# Patient Record
Sex: MALE | Race: WHITE | NOT HISPANIC OR LATINO | Employment: FULL TIME | URBAN - METROPOLITAN AREA
[De-identification: names, ages, dates, MRNs, and addresses within clinical notes are randomized per-mention and may not be internally consistent; named-entity substitution may affect disease eponyms.]

---

## 2023-12-29 ENCOUNTER — APPOINTMENT (OUTPATIENT)
Dept: CARDIOLOGY | Facility: HOSPITAL | Age: 53
End: 2023-12-29
Payer: COMMERCIAL

## 2023-12-29 ENCOUNTER — APPOINTMENT (OUTPATIENT)
Dept: RADIOLOGY | Facility: HOSPITAL | Age: 53
End: 2023-12-29
Payer: COMMERCIAL

## 2023-12-29 ENCOUNTER — HOSPITAL ENCOUNTER (EMERGENCY)
Facility: HOSPITAL | Age: 53
Discharge: HOME | End: 2023-12-29
Attending: EMERGENCY MEDICINE
Payer: COMMERCIAL

## 2023-12-29 VITALS
OXYGEN SATURATION: 95 % | DIASTOLIC BLOOD PRESSURE: 99 MMHG | HEIGHT: 72 IN | SYSTOLIC BLOOD PRESSURE: 137 MMHG | TEMPERATURE: 98.2 F | HEART RATE: 82 BPM | RESPIRATION RATE: 20 BRPM | WEIGHT: 210 LBS | BODY MASS INDEX: 28.44 KG/M2

## 2023-12-29 DIAGNOSIS — R56.9 SEIZURE (MULTI): Primary | ICD-10-CM

## 2023-12-29 LAB
ALBUMIN SERPL BCP-MCNC: 4.4 G/DL (ref 3.4–5)
ALP SERPL-CCNC: 53 U/L (ref 33–120)
ALT SERPL W P-5'-P-CCNC: 22 U/L (ref 10–52)
ANION GAP SERPL CALC-SCNC: 19 MMOL/L (ref 10–20)
AST SERPL W P-5'-P-CCNC: 18 U/L (ref 9–39)
ATRIAL RATE: 117 BPM
ATRIAL RATE: 87 BPM
BASOPHILS # BLD AUTO: 0.04 X10*3/UL (ref 0–0.1)
BASOPHILS NFR BLD AUTO: 0.5 %
BILIRUB SERPL-MCNC: 0.5 MG/DL (ref 0–1.2)
BUN SERPL-MCNC: 15 MG/DL (ref 6–23)
CALCIUM SERPL-MCNC: 8.7 MG/DL (ref 8.6–10.3)
CARDIAC TROPONIN I PNL SERPL HS: 3 NG/L (ref 0–20)
CARDIAC TROPONIN I PNL SERPL HS: 3 NG/L (ref 0–20)
CHLORIDE SERPL-SCNC: 102 MMOL/L (ref 98–107)
CO2 SERPL-SCNC: 19 MMOL/L (ref 21–32)
CREAT SERPL-MCNC: 1.13 MG/DL (ref 0.5–1.3)
EOSINOPHIL # BLD AUTO: 0.14 X10*3/UL (ref 0–0.7)
EOSINOPHIL NFR BLD AUTO: 1.6 %
ERYTHROCYTE [DISTWIDTH] IN BLOOD BY AUTOMATED COUNT: 17.8 % (ref 11.5–14.5)
GFR SERPL CREATININE-BSD FRML MDRD: 79 ML/MIN/1.73M*2
GLUCOSE SERPL-MCNC: 236 MG/DL (ref 74–99)
HCT VFR BLD AUTO: 42.2 % (ref 41–52)
HGB BLD-MCNC: 12.8 G/DL (ref 13.5–17.5)
IMM GRANULOCYTES # BLD AUTO: 0.07 X10*3/UL (ref 0–0.7)
IMM GRANULOCYTES NFR BLD AUTO: 0.8 % (ref 0–0.9)
INR PPP: 1 (ref 0.9–1.1)
LACTATE SERPL-SCNC: 2.4 MMOL/L (ref 0.4–2)
LACTATE SERPL-SCNC: 7.2 MMOL/L (ref 0.4–2)
LYMPHOCYTES # BLD AUTO: 3.13 X10*3/UL (ref 1.2–4.8)
LYMPHOCYTES NFR BLD AUTO: 35.3 %
MAGNESIUM SERPL-MCNC: 2.24 MG/DL (ref 1.6–2.4)
MCH RBC QN AUTO: 19.6 PG (ref 26–34)
MCHC RBC AUTO-ENTMCNC: 30.3 G/DL (ref 32–36)
MCV RBC AUTO: 65 FL (ref 80–100)
MONOCYTES # BLD AUTO: 0.63 X10*3/UL (ref 0.1–1)
MONOCYTES NFR BLD AUTO: 7.1 %
NEUTROPHILS # BLD AUTO: 4.85 X10*3/UL (ref 1.2–7.7)
NEUTROPHILS NFR BLD AUTO: 54.7 %
NRBC BLD-RTO: 0.2 /100 WBCS (ref 0–0)
P AXIS: 37 DEGREES
P AXIS: 47 DEGREES
P OFFSET: 185 MS
P OFFSET: 190 MS
P ONSET: 135 MS
P ONSET: 149 MS
PLATELET # BLD AUTO: 235 X10*3/UL (ref 150–450)
POTASSIUM SERPL-SCNC: 4.6 MMOL/L (ref 3.5–5.3)
PR INTERVAL: 148 MS
PR INTERVAL: 166 MS
PROT SERPL-MCNC: 6.9 G/DL (ref 6.4–8.2)
PROTHROMBIN TIME: 11.2 SECONDS (ref 9.8–12.8)
Q ONSET: 218 MS
Q ONSET: 223 MS
QRS COUNT: 14 BEATS
QRS COUNT: 19 BEATS
QRS DURATION: 68 MS
QRS DURATION: 76 MS
QT INTERVAL: 308 MS
QT INTERVAL: 342 MS
QTC CALCULATION(BAZETT): 411 MS
QTC CALCULATION(BAZETT): 429 MS
QTC FREDERICIA: 385 MS
QTC FREDERICIA: 387 MS
R AXIS: 118 DEGREES
R AXIS: 80 DEGREES
RBC # BLD AUTO: 6.54 X10*6/UL (ref 4.5–5.9)
SODIUM SERPL-SCNC: 135 MMOL/L (ref 136–145)
T AXIS: 15 DEGREES
T AXIS: 7 DEGREES
T OFFSET: 377 MS
T OFFSET: 389 MS
VENTRICULAR RATE: 117 BPM
VENTRICULAR RATE: 87 BPM
WBC # BLD AUTO: 8.9 X10*3/UL (ref 4.4–11.3)

## 2023-12-29 PROCEDURE — 84484 ASSAY OF TROPONIN QUANT: CPT

## 2023-12-29 PROCEDURE — 80053 COMPREHEN METABOLIC PANEL: CPT

## 2023-12-29 PROCEDURE — 99285 EMERGENCY DEPT VISIT HI MDM: CPT | Performed by: EMERGENCY MEDICINE

## 2023-12-29 PROCEDURE — 70450 CT HEAD/BRAIN W/O DYE: CPT | Performed by: RADIOLOGY

## 2023-12-29 PROCEDURE — 71045 X-RAY EXAM CHEST 1 VIEW: CPT | Performed by: RADIOLOGY

## 2023-12-29 PROCEDURE — 36415 COLL VENOUS BLD VENIPUNCTURE: CPT

## 2023-12-29 PROCEDURE — 83605 ASSAY OF LACTIC ACID: CPT

## 2023-12-29 PROCEDURE — 70498 CT ANGIOGRAPHY NECK: CPT | Performed by: RADIOLOGY

## 2023-12-29 PROCEDURE — 93005 ELECTROCARDIOGRAM TRACING: CPT

## 2023-12-29 PROCEDURE — 93010 ELECTROCARDIOGRAM REPORT: CPT | Performed by: EMERGENCY MEDICINE

## 2023-12-29 PROCEDURE — 70496 CT ANGIOGRAPHY HEAD: CPT | Performed by: RADIOLOGY

## 2023-12-29 PROCEDURE — 85025 COMPLETE CBC W/AUTO DIFF WBC: CPT

## 2023-12-29 PROCEDURE — 85610 PROTHROMBIN TIME: CPT

## 2023-12-29 PROCEDURE — 70450 CT HEAD/BRAIN W/O DYE: CPT | Mod: CCI

## 2023-12-29 PROCEDURE — 2550000001 HC RX 255 CONTRASTS: Performed by: EMERGENCY MEDICINE

## 2023-12-29 PROCEDURE — 71045 X-RAY EXAM CHEST 1 VIEW: CPT

## 2023-12-29 PROCEDURE — 83735 ASSAY OF MAGNESIUM: CPT

## 2023-12-29 PROCEDURE — 70498 CT ANGIOGRAPHY NECK: CPT

## 2023-12-29 RX ORDER — LEVETIRACETAM 750 MG/1
750 TABLET ORAL 2 TIMES DAILY
Qty: 60 TABLET | Refills: 0 | OUTPATIENT
Start: 2023-12-29 | End: 2024-05-29

## 2023-12-29 RX ADMIN — IOHEXOL 50 ML: 350 INJECTION, SOLUTION INTRAVENOUS at 08:25

## 2023-12-29 ASSESSMENT — COLUMBIA-SUICIDE SEVERITY RATING SCALE - C-SSRS
2. HAVE YOU ACTUALLY HAD ANY THOUGHTS OF KILLING YOURSELF?: NO
1. IN THE PAST MONTH, HAVE YOU WISHED YOU WERE DEAD OR WISHED YOU COULD GO TO SLEEP AND NOT WAKE UP?: NO
6. HAVE YOU EVER DONE ANYTHING, STARTED TO DO ANYTHING, OR PREPARED TO DO ANYTHING TO END YOUR LIFE?: NO

## 2023-12-29 ASSESSMENT — PAIN SCALES - GENERAL
PAINLEVEL_OUTOF10: 0 - NO PAIN
PAINLEVEL_OUTOF10: 0 - NO PAIN

## 2023-12-29 ASSESSMENT — LIFESTYLE VARIABLES
EVER FELT BAD OR GUILTY ABOUT YOUR DRINKING: NO
HAVE YOU EVER FELT YOU SHOULD CUT DOWN ON YOUR DRINKING: NO
REASON UNABLE TO ASSESS: NO
HAVE PEOPLE ANNOYED YOU BY CRITICIZING YOUR DRINKING: NO
EVER HAD A DRINK FIRST THING IN THE MORNING TO STEADY YOUR NERVES TO GET RID OF A HANGOVER: NO

## 2023-12-29 ASSESSMENT — PAIN - FUNCTIONAL ASSESSMENT: PAIN_FUNCTIONAL_ASSESSMENT: 0-10

## 2023-12-29 NOTE — DISCHARGE INSTRUCTIONS
Seek immediate medical attention if you develop:  additional seizures, fever, headache, nausea, vomiting, weakness, numbness, tingling, loss of motion in your arms or legs, chest pain, shortness of breath, or any new or worsening symptoms.    Do not do any activities where it would be dangerous if you had a seizure.  These activities include, but are not limited to: driving, operating machinery, swimming alone, bathing alone, riding a bike, rock climbing, etc....    Use caution when you are around stairs or other situations where it would be dangerous if you had a seizure.    Your sugar was elevated today.  You need to follow this up with a doctor.

## 2023-12-29 NOTE — ED PROVIDER NOTES
EMERGENCY DEPARTMENT ENCOUNTER      Pt Name: Lucio Sigala  MRN: [id number]  Birthdate 1970  Date of evaluation: 12/29/2023  Provider: Marv Huber MD    CHIEF COMPLAINT       Chief Complaint   Patient presents with    Seizures         HISTORY OF PRESENT ILLNESS    HPI  Patient is a 53-year-old male presenting with seizure.  This occurred approximate 30 minutes prior to presentation.  Wife states she was sitting next to him in bed when he suddenly threw his hands up and then began to have generalized convulsions.  This lasted approximately 5 minutes.  After this, he was difficult to arouse.  Unknown length of postictal period as patient was placed into the ambulance and neither EMS nor the wife know the exact interval.  At presentation, patient is complaining of chronic neck pain but is otherwise asymptomatic.  He denies fever, chills, chest pain, shortness of breath, headache, change in vision, numbness, tingling.    Nursing Notes were reviewed.    PAST MEDICAL HISTORY     Past Medical History:   Diagnosis Date    Seizures (CMS/[facility])          SURGICAL HISTORY     History reviewed. No pertinent surgical history.      CURRENT MEDICATIONS       Previous Medications    No medications on file       ALLERGIES     Patient has no known allergies.    FAMILY HISTORY     No family history on file.       SOCIAL HISTORY       Social History     Socioeconomic History    Marital status:      Spouse name: None    Number of children: None    Years of education: None    Highest education level: None   Occupational History    None   Tobacco Use    Smoking status: Never    Smokeless tobacco: Never   Substance and Sexual Activity    Alcohol use: Yes     Comment: twice a month    Drug use: Never    Sexual activity: None   Other Topics Concern    None   Social History Narrative    None     Social Determinants of Health     Financial Resource Strain: Not on file   Food Insecurity: Not on file   Transportation Needs: Not on file    Physical Activity: Not on file   Stress: Not on file   Social Connections: Not on file   Intimate Partner Violence: Not on file   Housing Stability: Not on file       SCREENINGS                        PHYSICAL EXAM    (up to 7 for level 4, 8 or more for level 5)     ED Triage Vitals [12/29/23 0639]   Temp Heart Rate Resp BP   36.3 °C (97.3 °F) 109 18 169/87      SpO2 Temp Source Heart Rate Source Patient Position   92 % Tympanic Monitor --      BP Location FiO2 (%)     -- --       Physical Exam  Vitals and nursing note reviewed.   Constitutional:       General: He is not in acute distress.     Appearance: He is not toxic-appearing.   HENT:      Head: Normocephalic and atraumatic.      Nose: Nose normal. No congestion or rhinorrhea.      Mouth/Throat:      Mouth: Mucous membranes are moist.      Pharynx: Oropharynx is clear.   Eyes:      General:         Right eye: No discharge.         Left eye: No discharge.      Extraocular Movements: Extraocular movements intact.      Conjunctiva/sclera: Conjunctivae normal.      Pupils: Pupils are equal, round, and reactive to light.   Cardiovascular:      Rate and Rhythm: Normal rate and regular rhythm.      Pulses: Normal pulses.      Heart sounds: Normal heart sounds.   Pulmonary:      Effort: Pulmonary effort is normal.      Breath sounds: Normal breath sounds.   Abdominal:      General: There is no distension.      Palpations: Abdomen is soft.      Tenderness: There is no abdominal tenderness.   Musculoskeletal:         General: No swelling, deformity or signs of injury. Normal range of motion.      Cervical back: Normal range of motion and neck supple.   Skin:     General: Skin is warm and dry.   Neurological:      General: No focal deficit present.      Mental Status: He is oriented to person, place, and time.      Cranial Nerves: No cranial nerve deficit.      Sensory: No sensory deficit.      Motor: No weakness.          DIAGNOSTIC RESULTS     LABS:  Labs Reviewed    CBC WITH AUTO DIFFERENTIAL - Abnormal       Result Value    WBC 8.9      nRBC 0.2 (*)     RBC 6.54 (*)     Hemoglobin 12.8 (*)     Hematocrit 42.2      MCV 65 (*)     MCH 19.6 (*)     MCHC 30.3 (*)     RDW 17.8 (*)     Platelets 235      Neutrophils % 54.7      Immature Granulocytes %, Automated 0.8      Lymphocytes % 35.3      Monocytes % 7.1      Eosinophils % 1.6      Basophils % 0.5      Neutrophils Absolute 4.85      Immature Granulocytes Absolute, Automated 0.07      Lymphocytes Absolute 3.13      Monocytes Absolute 0.63      Eosinophils Absolute 0.14      Basophils Absolute 0.04     COMPREHENSIVE METABOLIC PANEL - Abnormal    Glucose 236 (*)     Sodium 135 (*)     Potassium 4.6      Chloride 102      Bicarbonate 19 (*)     Anion Gap 19      Urea Nitrogen 15      Creatinine 1.13      eGFR 79      Calcium 8.7      Albumin 4.4      Alkaline Phosphatase 53      Total Protein 6.9      AST 18      Bilirubin, Total 0.5      ALT 22     LACTATE - Abnormal    Lactate 7.2 (*)     Narrative:     Venipuncture immediately after or during the administration of Metamizole may lead to falsely low results. Testing should be performed immediately  prior to Metamizole dosing.   LACTATE - Abnormal    Lactate 2.4 (*)     Narrative:     Venipuncture immediately after or during the administration of Metamizole may lead to falsely low results. Testing should be performed immediately  prior to Metamizole dosing.   MAGNESIUM - Normal    Magnesium 2.24     PROTIME-INR - Normal    Protime 11.2      INR 1.0     SERIAL TROPONIN-INITIAL - Normal    Troponin I, High Sensitivity 3      Narrative:     Less than 99th percentile of normal range cutoff-  Female and children under 18 years old <14 ng/L; Male <21 ng/L: Negative  Repeat testing should be performed if clinically indicated.     Female and children under 18 years old 14-50 ng/L; Male 21-50 ng/L:  Consistent with possible cardiac damage and possible increased clinical   risk. Serial  measurements may help to assess extent of myocardial damage.     >50 ng/L: Consistent with cardiac damage, increased clinical risk and  myocardial infarction. Serial measurements may help assess extent of   myocardial damage.      NOTE: Children less than 1 year old may have higher baseline troponin   levels and results should be interpreted in conjunction with the overall   clinical context.     NOTE: Troponin I testing is performed using a different   testing methodology at [facility] than at other   [facility]. Direct result comparisons should only   be made within the same method.   SERIAL TROPONIN, 1 HOUR - Normal    Troponin I, High Sensitivity 3      Narrative:     Less than 99th percentile of normal range cutoff-  Female and children under 18 years old <14 ng/L; Male <21 ng/L: Negative  Repeat testing should be performed if clinically indicated.     Female and children under 18 years old 14-50 ng/L; Male 21-50 ng/L:  Consistent with possible cardiac damage and possible increased clinical   risk. Serial measurements may help to assess extent of myocardial damage.     >50 ng/L: Consistent with cardiac damage, increased clinical risk and  myocardial infarction. Serial measurements may help assess extent of   myocardial damage.      NOTE: Children less than 1 year old may have higher baseline troponin   levels and results should be interpreted in conjunction with the overall   clinical context.     NOTE: Troponin I testing is performed using a different   testing methodology at [facility] than at other   [facility]. Direct result comparisons should only   be made within the same method.   TROPONIN SERIES- (INITIAL, 1 HR)    Narrative:     The following orders were created for panel order Troponin I Series, High Sensitivity (0, 1 HR).  Procedure                               Abnormality         Status                     ---------                               -----------          ------                     Troponin I, High Sensiti...[[id number]]  Normal              Final result               Troponin, High Sensitivi...[[id number]]  Normal              Final result                 Please view results for these tests on the individual orders.       All other labs were within normal range or not returned as of this dictation.    Imaging  CT head wo IV contrast   Final Result   Addendum (preliminary) 1 of 1   Interpreted By:  Lucio Chaves,    ADDENDUM:   A 1.6 cm line of increased attenuation is present within the scalp of   the right occipital level which may be due to a scalp hematoma,   correlate for any recent trauma.        Signed by: Lucio Chaves 12/29/2023 8:41 AM        -------- ORIGINAL REPORT --------   Dictation workstation:   [id number]      Final   Unremarkable exam.        Signed by: Lucio Chaves 12/29/2023 8:30 AM   Dictation workstation:   [id number]      CT angio head and neck w and wo IV contrast   Final Result   Cervical and intracranial vasculature is within normal limits. No   evidence of dissection.        _____________   NASCET criteria for internal carotid artery stenosis:   Mild: 0% to 49%   Moderate: 50% to 69%   Severe: 70% to 99%   Complete Occlusion        Signed by: Gunjan Burnett 12/29/2023 8:55 AM   Dictation workstation:   [id number]      XR chest 1 view   Final Result   No acute cardiopulmonary disease.        MACRO:   none        Signed by: Anisa Lei 12/29/2023 7:40 AM   Dictation workstation:   [id number]           Procedures  Procedures     EMERGENCY DEPARTMENT COURSE/MDM:     Diagnoses as of 12/29/23 1630   Seizure (CMS/[facility])        Medical Decision Making  =================Attending note===============    The patient was seen by the resident/fellow.  I have personally performed a substantive portion of the encounter.  I have seen and examined the patient; agree with the workup, evaluation, MDM,   management and diagnosis.  The care plan has been  discussed with the resident; I have reviewed the resident's note and agree with the documented findings.      This is a 53 y.o. male who presents to ER with concern for seizure.  Patient was laying in bed and his arms went above his head.  He then developed a full body tonic-clonic seizure.  He last around 5 minutes.  He has remote history of seizures around 20 years ago.  He has not had one since then.  He had about 3 seizures then.  He thought he may have had a neck injury at that time.  States he went to a chiropractor.  He was on seizure medications for short period of time and he discontinued them himself because he did not think they were making a difference and he did not like the side effects.  Denies diabetes or hypertension hyperlipidemia.  His wife states that he has apneic episodes at night and snores.  It sounds like she is describing sleep apnea.  Heart is regular.  Lungs are clear.  Abdomen is soft and nontender.  NIH stroke score is a 0.                ==========================================        History obtained from the patient and his wife.  Records including labs, imaging, notes reviewed.  Given his history and physical, there is high concern for new seizure.  Cardiac labs with lactate were drawn.  Lactate notably elevated above 7.  This decreased on redraw to 2.4 without any intervention.  CMP notable for hyperglycemia of 236 consistent with his known diabetes.  Hematology notable for mild anemia of hemoglobin 12.8 only.  Troponin series within normal limits.  EKG without acute injury pattern.  Chest x-ray without acute findings.  Given his unknown vascular history.  CT angio of the head and neck were obtained in addition to the CT of the head.  All these were negative for any acute findings.  Case was discussed with Dr. Chow of neurology.  He recommended the patient be started on 750 mg Keppra twice daily for epilepsy and to follow-up in neurology clinic since the patient had return to  baseline.  There is no indication for admission at this time.  This was discussed with the patient who adamantly stated that he did not have epilepsy, that when he had been on Dilantin previously had done nothing, that all of his problems had been fixed after having a chiropractor adjusted his neck, and that that is all he would be doing now.  The risks of having another seizure, especially while driving, were discussed at length with the patient.  It was highly recommended that he not drive until he was evaluated by neurology, which he refused to do.  Regardless, patient was provided with the contact information for the neurologist office as well as a paper prescription for Keppra which his wife agreed to keep on hand should he change his mind.      Patient and or family in agreement and understanding of treatment plan.  All questions answered.      I reviewed the case with the attending ED physician. The attending ED physician agrees with the plan. Patient and/or patient´s representative was counseled regarding labs, imaging, likely diagnosis, and plan. All questions were answered.    ED Medications administered this visit:  Medications - No data to display    New Prescriptions from this visit:    New Prescriptions    No medications on file       Follow-up:  No follow-up provider specified.      Final Impression: No diagnosis found.      (Please note that portions of this note were completed with a voice recognition program.  Efforts were made to edit the dictations but occasionally words are mis-transcribed.)     Marv Huber MD  Resident  12/29/23 7807

## 2024-01-12 ENCOUNTER — APPOINTMENT (OUTPATIENT)
Dept: NEUROLOGY | Facility: CLINIC | Age: 54
End: 2024-01-12
Payer: COMMERCIAL

## 2024-01-26 ENCOUNTER — APPOINTMENT (OUTPATIENT)
Dept: NEUROLOGY | Facility: CLINIC | Age: 54
End: 2024-01-26
Payer: COMMERCIAL

## 2024-05-29 ENCOUNTER — CLINICAL SUPPORT (OUTPATIENT)
Dept: EMERGENCY MEDICINE | Facility: HOSPITAL | Age: 54
End: 2024-05-29
Payer: COMMERCIAL

## 2024-05-29 ENCOUNTER — APPOINTMENT (OUTPATIENT)
Dept: RADIOLOGY | Facility: HOSPITAL | Age: 54
End: 2024-05-29
Payer: COMMERCIAL

## 2024-05-29 ENCOUNTER — HOSPITAL ENCOUNTER (EMERGENCY)
Facility: HOSPITAL | Age: 54
Discharge: HOME | End: 2024-05-29
Attending: EMERGENCY MEDICINE
Payer: COMMERCIAL

## 2024-05-29 VITALS
BODY MASS INDEX: 27.8 KG/M2 | RESPIRATION RATE: 18 BRPM | TEMPERATURE: 97.9 F | HEART RATE: 96 BPM | WEIGHT: 205 LBS | SYSTOLIC BLOOD PRESSURE: 130 MMHG | DIASTOLIC BLOOD PRESSURE: 87 MMHG | OXYGEN SATURATION: 95 %

## 2024-05-29 DIAGNOSIS — M54.2 NECK PAIN: ICD-10-CM

## 2024-05-29 DIAGNOSIS — G40.309 NONINTRACTABLE GENERALIZED IDIOPATHIC EPILEPSY WITHOUT STATUS EPILEPTICUS (MULTI): ICD-10-CM

## 2024-05-29 DIAGNOSIS — R55 SYNCOPE AND COLLAPSE: Primary | ICD-10-CM

## 2024-05-29 DIAGNOSIS — R79.89 ELEVATED LACTIC ACID LEVEL: ICD-10-CM

## 2024-05-29 LAB
ALBUMIN SERPL BCP-MCNC: 4 G/DL (ref 3.4–5)
ALP SERPL-CCNC: 54 U/L (ref 33–120)
ALT SERPL W P-5'-P-CCNC: 16 U/L (ref 10–52)
AMPHETAMINES UR QL SCN: NORMAL
ANION GAP BLDV CALCULATED.4IONS-SCNC: 14 MMOL/L (ref 10–25)
ANION GAP SERPL CALC-SCNC: 19 MMOL/L (ref 10–20)
APAP SERPL-MCNC: <10 UG/ML
APPEARANCE UR: CLEAR
AST SERPL W P-5'-P-CCNC: 26 U/L (ref 9–39)
BARBITURATES UR QL SCN: NORMAL
BASE EXCESS BLDV CALC-SCNC: -6.7 MMOL/L (ref -2–3)
BASOPHILS # BLD AUTO: 0.06 X10*3/UL (ref 0–0.1)
BASOPHILS NFR BLD AUTO: 0.6 %
BENZODIAZ UR QL SCN: NORMAL
BILIRUB SERPL-MCNC: 0.6 MG/DL (ref 0–1.2)
BILIRUB UR STRIP.AUTO-MCNC: NEGATIVE MG/DL
BODY TEMPERATURE: 37 DEGREES CELSIUS
BUN SERPL-MCNC: 13 MG/DL (ref 6–23)
BZE UR QL SCN: NORMAL
CA-I BLDV-SCNC: 1.07 MMOL/L (ref 1.1–1.33)
CALCIUM SERPL-MCNC: 7.8 MG/DL (ref 8.6–10.6)
CANNABINOIDS UR QL SCN: NORMAL
CARDIAC TROPONIN I PNL SERPL HS: 3 NG/L (ref 0–53)
CARDIAC TROPONIN I PNL SERPL HS: <3 NG/L (ref 0–53)
CHLORIDE BLDV-SCNC: 105 MMOL/L (ref 98–107)
CHLORIDE SERPL-SCNC: 106 MMOL/L (ref 98–107)
CO2 SERPL-SCNC: 16 MMOL/L (ref 21–32)
COLOR UR: ABNORMAL
CREAT SERPL-MCNC: 0.88 MG/DL (ref 0.5–1.3)
EGFRCR SERPLBLD CKD-EPI 2021: >90 ML/MIN/1.73M*2
EOSINOPHIL # BLD AUTO: 0.08 X10*3/UL (ref 0–0.7)
EOSINOPHIL NFR BLD AUTO: 0.8 %
ERYTHROCYTE [DISTWIDTH] IN BLOOD BY AUTOMATED COUNT: 17.2 % (ref 11.5–14.5)
ETHANOL SERPL-MCNC: <10 MG/DL
FENTANYL+NORFENTANYL UR QL SCN: NORMAL
GLUCOSE BLDV-MCNC: 156 MG/DL (ref 74–99)
GLUCOSE SERPL-MCNC: 155 MG/DL (ref 74–99)
GLUCOSE UR STRIP.AUTO-MCNC: NORMAL MG/DL
HCO3 BLDV-SCNC: 19.7 MMOL/L (ref 22–26)
HCT VFR BLD AUTO: 37.6 % (ref 41–52)
HCT VFR BLD EST: 36 % (ref 41–52)
HGB BLD-MCNC: 11.7 G/DL (ref 13.5–17.5)
HGB BLDV-MCNC: 11.9 G/DL (ref 13.5–17.5)
IMM GRANULOCYTES # BLD AUTO: 0.1 X10*3/UL (ref 0–0.7)
IMM GRANULOCYTES NFR BLD AUTO: 1 % (ref 0–0.9)
KETONES UR STRIP.AUTO-MCNC: NEGATIVE MG/DL
LACTATE BLDV-SCNC: 5.8 MMOL/L (ref 0.4–2)
LACTATE SERPL-SCNC: 1.1 MMOL/L (ref 0.4–2)
LACTATE SERPL-SCNC: 3.8 MMOL/L (ref 0.4–2)
LEUKOCYTE ESTERASE UR QL STRIP.AUTO: NEGATIVE
LYMPHOCYTES # BLD AUTO: 2.29 X10*3/UL (ref 1.2–4.8)
LYMPHOCYTES NFR BLD AUTO: 23.5 %
MAGNESIUM SERPL-MCNC: 2.07 MG/DL (ref 1.6–2.4)
MCH RBC QN AUTO: 19.3 PG (ref 26–34)
MCHC RBC AUTO-ENTMCNC: 31.1 G/DL (ref 32–36)
MCV RBC AUTO: 62 FL (ref 80–100)
METHADONE UR QL SCN: NORMAL
MONOCYTES # BLD AUTO: 0.65 X10*3/UL (ref 0.1–1)
MONOCYTES NFR BLD AUTO: 6.7 %
NEUTROPHILS # BLD AUTO: 6.56 X10*3/UL (ref 1.2–7.7)
NEUTROPHILS NFR BLD AUTO: 67.4 %
NITRITE UR QL STRIP.AUTO: NEGATIVE
NRBC BLD-RTO: 0 /100 WBCS (ref 0–0)
OPIATES UR QL SCN: NORMAL
OXYCODONE+OXYMORPHONE UR QL SCN: NORMAL
OXYHGB MFR BLDV: 43.2 % (ref 45–75)
PCO2 BLDV: 42 MM HG (ref 41–51)
PCP UR QL SCN: NORMAL
PH BLDV: 7.28 PH (ref 7.33–7.43)
PH UR STRIP.AUTO: 5.5 [PH]
PLATELET # BLD AUTO: 215 X10*3/UL (ref 150–450)
PO2 BLDV: 31 MM HG (ref 35–45)
POTASSIUM BLDV-SCNC: 4.3 MMOL/L (ref 3.5–5.3)
POTASSIUM SERPL-SCNC: 4.4 MMOL/L (ref 3.5–5.3)
PROT SERPL-MCNC: 6.2 G/DL (ref 6.4–8.2)
PROT UR STRIP.AUTO-MCNC: ABNORMAL MG/DL
RBC # BLD AUTO: 6.06 X10*6/UL (ref 4.5–5.9)
RBC # UR STRIP.AUTO: ABNORMAL /UL
RBC #/AREA URNS AUTO: NORMAL /HPF
SALICYLATES SERPL-MCNC: <3 MG/DL
SAO2 % BLDV: 44 % (ref 45–75)
SODIUM BLDV-SCNC: 134 MMOL/L (ref 136–145)
SODIUM SERPL-SCNC: 137 MMOL/L (ref 136–145)
SP GR UR STRIP.AUTO: 1.02
TSH SERPL-ACNC: 2.4 MIU/L (ref 0.44–3.98)
UROBILINOGEN UR STRIP.AUTO-MCNC: NORMAL MG/DL
WBC # BLD AUTO: 9.7 X10*3/UL (ref 4.4–11.3)
WBC #/AREA URNS AUTO: NORMAL /HPF

## 2024-05-29 PROCEDURE — 85025 COMPLETE CBC W/AUTO DIFF WBC: CPT | Performed by: PHYSICIAN ASSISTANT

## 2024-05-29 PROCEDURE — 96361 HYDRATE IV INFUSION ADD-ON: CPT

## 2024-05-29 PROCEDURE — 84132 ASSAY OF SERUM POTASSIUM: CPT

## 2024-05-29 PROCEDURE — 36415 COLL VENOUS BLD VENIPUNCTURE: CPT | Performed by: PHYSICIAN ASSISTANT

## 2024-05-29 PROCEDURE — 71046 X-RAY EXAM CHEST 2 VIEWS: CPT | Mod: FOREIGN READ | Performed by: RADIOLOGY

## 2024-05-29 PROCEDURE — 84132 ASSAY OF SERUM POTASSIUM: CPT | Mod: 59 | Performed by: PHYSICIAN ASSISTANT

## 2024-05-29 PROCEDURE — 83605 ASSAY OF LACTIC ACID: CPT | Performed by: PHYSICIAN ASSISTANT

## 2024-05-29 PROCEDURE — 93005 ELECTROCARDIOGRAM TRACING: CPT

## 2024-05-29 PROCEDURE — 84443 ASSAY THYROID STIM HORMONE: CPT | Performed by: PHYSICIAN ASSISTANT

## 2024-05-29 PROCEDURE — 84484 ASSAY OF TROPONIN QUANT: CPT | Performed by: PHYSICIAN ASSISTANT

## 2024-05-29 PROCEDURE — 80143 DRUG ASSAY ACETAMINOPHEN: CPT | Performed by: PHYSICIAN ASSISTANT

## 2024-05-29 PROCEDURE — 2500000004 HC RX 250 GENERAL PHARMACY W/ HCPCS (ALT 636 FOR OP/ED): Performed by: PHYSICIAN ASSISTANT

## 2024-05-29 PROCEDURE — 80307 DRUG TEST PRSMV CHEM ANLYZR: CPT | Performed by: PHYSICIAN ASSISTANT

## 2024-05-29 PROCEDURE — 72128 CT CHEST SPINE W/O DYE: CPT

## 2024-05-29 PROCEDURE — 70450 CT HEAD/BRAIN W/O DYE: CPT

## 2024-05-29 PROCEDURE — 83735 ASSAY OF MAGNESIUM: CPT | Performed by: PHYSICIAN ASSISTANT

## 2024-05-29 PROCEDURE — 99285 EMERGENCY DEPT VISIT HI MDM: CPT | Performed by: PHYSICIAN ASSISTANT

## 2024-05-29 PROCEDURE — 2500000005 HC RX 250 GENERAL PHARMACY W/O HCPCS: Performed by: PHYSICIAN ASSISTANT

## 2024-05-29 PROCEDURE — 93010 ELECTROCARDIOGRAM REPORT: CPT | Performed by: PHYSICIAN ASSISTANT

## 2024-05-29 PROCEDURE — 72125 CT NECK SPINE W/O DYE: CPT

## 2024-05-29 PROCEDURE — 72125 CT NECK SPINE W/O DYE: CPT | Performed by: RADIOLOGY

## 2024-05-29 PROCEDURE — 99285 EMERGENCY DEPT VISIT HI MDM: CPT | Mod: 25

## 2024-05-29 PROCEDURE — 82810 BLOOD GASES O2 SAT ONLY: CPT | Mod: CCI

## 2024-05-29 PROCEDURE — 71046 X-RAY EXAM CHEST 2 VIEWS: CPT

## 2024-05-29 PROCEDURE — 36415 COLL VENOUS BLD VENIPUNCTURE: CPT

## 2024-05-29 PROCEDURE — 96374 THER/PROPH/DIAG INJ IV PUSH: CPT

## 2024-05-29 PROCEDURE — 70450 CT HEAD/BRAIN W/O DYE: CPT | Performed by: RADIOLOGY

## 2024-05-29 PROCEDURE — 72128 CT CHEST SPINE W/O DYE: CPT | Mod: FOREIGN READ | Performed by: RADIOLOGY

## 2024-05-29 PROCEDURE — 81001 URINALYSIS AUTO W/SCOPE: CPT | Performed by: PHYSICIAN ASSISTANT

## 2024-05-29 RX ORDER — LIDOCAINE 50 MG/G
1 PATCH TOPICAL DAILY
Qty: 7 PATCH | Refills: 0 | Status: SHIPPED | OUTPATIENT
Start: 2024-05-29 | End: 2024-05-29

## 2024-05-29 RX ORDER — LEVETIRACETAM 1000 MG/1
1000 TABLET ORAL 2 TIMES DAILY
Qty: 60 TABLET | Refills: 0 | Status: SHIPPED | OUTPATIENT
Start: 2024-05-29 | End: 2024-05-29

## 2024-05-29 RX ORDER — LIDOCAINE 50 MG/G
1 PATCH TOPICAL DAILY
Qty: 7 PATCH | Refills: 0 | Status: SHIPPED | OUTPATIENT
Start: 2024-05-29 | End: 2024-06-05

## 2024-05-29 RX ORDER — ACETAMINOPHEN 325 MG/1
975 TABLET ORAL ONCE
Status: COMPLETED | OUTPATIENT
Start: 2024-05-29 | End: 2024-05-29

## 2024-05-29 RX ORDER — KETOROLAC TROMETHAMINE 30 MG/ML
30 INJECTION, SOLUTION INTRAMUSCULAR; INTRAVENOUS ONCE
Status: COMPLETED | OUTPATIENT
Start: 2024-05-29 | End: 2024-05-29

## 2024-05-29 RX ORDER — LIDOCAINE 560 MG/1
1 PATCH PERCUTANEOUS; TOPICAL; TRANSDERMAL ONCE
Status: DISCONTINUED | OUTPATIENT
Start: 2024-05-29 | End: 2024-05-29 | Stop reason: HOSPADM

## 2024-05-29 RX ORDER — NAPROXEN 500 MG/1
500 TABLET ORAL
Qty: 10 TABLET | Refills: 0 | Status: SHIPPED | OUTPATIENT
Start: 2024-05-29 | End: 2024-05-29

## 2024-05-29 RX ORDER — NAPROXEN 500 MG/1
500 TABLET ORAL
Qty: 10 TABLET | Refills: 0 | Status: SHIPPED | OUTPATIENT
Start: 2024-05-29 | End: 2024-06-03

## 2024-05-29 RX ORDER — CYCLOBENZAPRINE HCL 10 MG
5 TABLET ORAL ONCE
Status: DISCONTINUED | OUTPATIENT
Start: 2024-05-29 | End: 2024-05-29

## 2024-05-29 RX ORDER — LEVETIRACETAM 750 MG/1
750 TABLET ORAL 2 TIMES DAILY
Qty: 60 TABLET | Refills: 0 | Status: SHIPPED | OUTPATIENT
Start: 2024-05-29 | End: 2024-06-28

## 2024-05-29 RX ADMIN — ACETAMINOPHEN 975 MG: 325 TABLET ORAL at 16:21

## 2024-05-29 RX ADMIN — LIDOCAINE 1 PATCH: 4 PATCH TOPICAL at 16:21

## 2024-05-29 RX ADMIN — SODIUM CHLORIDE 1000 ML: 9 INJECTION, SOLUTION INTRAVENOUS at 12:47

## 2024-05-29 RX ADMIN — KETOROLAC TROMETHAMINE 30 MG: 30 INJECTION, SOLUTION INTRAMUSCULAR; INTRAVENOUS at 16:21

## 2024-05-29 ASSESSMENT — PAIN SCALES - GENERAL: PAINLEVEL_OUTOF10: 3

## 2024-05-29 ASSESSMENT — PAIN DESCRIPTION - DESCRIPTORS: DESCRIPTORS: ACHING;DISCOMFORT

## 2024-05-29 ASSESSMENT — PAIN DESCRIPTION - ORIENTATION: ORIENTATION: POSTERIOR

## 2024-05-29 ASSESSMENT — PAIN DESCRIPTION - PROGRESSION: CLINICAL_PROGRESSION: GRADUALLY IMPROVING

## 2024-05-29 ASSESSMENT — PAIN - FUNCTIONAL ASSESSMENT: PAIN_FUNCTIONAL_ASSESSMENT: 0-10

## 2024-05-29 ASSESSMENT — PAIN DESCRIPTION - PAIN TYPE: TYPE: ACUTE PAIN

## 2024-05-29 ASSESSMENT — PAIN DESCRIPTION - LOCATION: LOCATION: NECK

## 2024-05-29 NOTE — DISCHARGE INSTRUCTIONS
You should not be driving with this possible history of seizures, at least for 6 months or until cleared by neurology.    Make sure you do not stand on any elevated surfaces, swim unattended, do anything that if you were to suddenly seize could harm yourself or others.    Make sure you are taking your seizure medications as prescribed.    If you experience an increase in seizure activity in terms of frequency, duration, etc. or you sustain any injuries while seizing, recommend return to the ER.    You should follow up with a neurologist. Our neurology team at  has recommended an EEG and brain MRI. They have also recommended you start taking Keppra, an anti-seizure medication. A prescription for Keppra was sent to your pharmacy.

## 2024-05-29 NOTE — ED TRIAGE NOTES
Pt presents to the ED after being found lying on the floor at work with snoring respirations and pinpoint pupils. Upon EMS arrival pt was given 4mg IN narcan and woke up. Pt is awake and walking around upon arrival to ED. Per chart pt has hx of seizures but refuses them in person.

## 2024-05-29 NOTE — ED PROVIDER NOTES
HPI   Chief Complaint   Patient presents with    Unresponsive       Patient is a 54-year-old male with documented history of hyperglycemia and seizures who personally denies any history of this who presents today with unresponsive episode.  Patient tells me he cannot remember exactly what happened, he found himself in the EMS truck, he denies having any aura, chest pain, shortness of breath, any lightheadedness dizziness or any inkling that he was about to pass out.  History was partially provided by the , patient is a  and is accompanied by his coworkers, they stated that they left in meeting and upon return to the room patient had pinpoint pupils and was snoring loudly, they gave him 4 of Narcan and patient woke up.  They are unsure whether he had a postictal period.  Patient denies any tongue biting, incontinence.  He denies any history of seizures despite the fact that this is well-documented in his chart, he was seen here in December after a seizure episode, which was discussed with neurology, patient was started on Keppra however per notes he refused to follow-up with neurology and refused to take his Keppra.  He was also instructed not to drive at that time.  Patient currently denies any symptoms, he is awake and alert, oriented x 3, he has no chest pain shortness of breath heart palpitations dizziness, lightheadedness, headaches, he does not believe he injured himself in any way, denies any drug or alcohol use, denies any medical problems, denies abdominal pain, denies any recent dieting, changes in lifestyle or anything that would explain his passing out episode.  Denies cardiac past medical history.                            [place] Coma Scale Score: 15                     Patient History   Past Medical History:   Diagnosis Date    Seizures (Multi)      History reviewed. No pertinent surgical history.  No family history on file.  Social History     Tobacco Use    Smoking status:  Never    Smokeless tobacco: Never   Substance Use Topics    Alcohol use: Yes     Comment: twice a month    Drug use: Never       Physical Exam   ED Triage Vitals [05/29/24 1213]   Temperature Heart Rate Respirations BP   36.7 °C (98 °F) (!) 103 16 142/62      Pulse Ox Temp src Heart Rate Source Patient Position   100 % -- -- --      BP Location FiO2 (%)     -- --       Physical Exam  Vitals and nursing note reviewed.   Constitutional:       General: He is not in acute distress.     Appearance: Normal appearance. He is not toxic-appearing.   HENT:      Head: Normocephalic and atraumatic.      Nose: Nose normal.   Eyes:      General: No visual field deficit.     Extraocular Movements: Extraocular movements intact.   Cardiovascular:      Rate and Rhythm: Normal rate and regular rhythm.   Pulmonary:      Effort: Pulmonary effort is normal.      Breath sounds: Normal breath sounds. No wheezing, rhonchi or rales.   Abdominal:      Palpations: Abdomen is soft.      Tenderness: There is no abdominal tenderness. There is no guarding.   Musculoskeletal:         General: Normal range of motion.      Cervical back: Normal range of motion and neck supple.   Skin:     General: Skin is warm and dry.   Neurological:      General: No focal deficit present.      Mental Status: He is alert and oriented to person, place, and time.      GCS: GCS eye subscore is 4. GCS verbal subscore is 5. GCS motor subscore is 6.      Cranial Nerves: Cranial nerves 2-12 are intact. No cranial nerve deficit, dysarthria or facial asymmetry.      Sensory: Sensation is intact. No sensory deficit.      Motor: Motor function is intact. No weakness.   Psychiatric:         Mood and Affect: Mood normal.         Thought Content: Thought content normal.       CT head wo IV contrast   Final Result   No evidence of intracranial mass, extra-axial collection, hemorrhage   or infarction        MACRO:   none        Signed by: Cam Lopez 5/29/2024 1:22 PM    Dictation workstation:   [id number]      CT thoracic spine wo IV contrast   Final Result   No acute fracture or malalignment.  Minimal degenerative changes as   described.   Signed by Durga Ivey DO      CT cervical spine wo IV contrast   Final Result   Normal examination of the cervical spine. No evidence of fracture,   subluxation, bony canal stenosis or bony foraminal narrowing        MACRO:   none        Signed by: Cam Lopez 5/29/2024 1:23 PM   Dictation workstation:   [id number]      XR chest 2 views   Final Result   No regions of airspace consolidation.   Signed by Laith Raymundo MD      MR brain w and wo IV contrast    (Results Pending)     Labs Reviewed   CBC WITH AUTO DIFFERENTIAL - Abnormal       Result Value    WBC 9.7      nRBC 0.0      RBC 6.06 (*)     Hemoglobin 11.7 (*)     Hematocrit 37.6 (*)     MCV 62 (*)     MCH 19.3 (*)     MCHC 31.1 (*)     RDW 17.2 (*)     Platelets 215      Neutrophils % 67.4      Immature Granulocytes %, Automated 1.0 (*)     Lymphocytes % 23.5      Monocytes % 6.7      Eosinophils % 0.8      Basophils % 0.6      Neutrophils Absolute 6.56      Immature Granulocytes Absolute, Automated 0.10      Lymphocytes Absolute 2.29      Monocytes Absolute 0.65      Eosinophils Absolute 0.08      Basophils Absolute 0.06     COMPREHENSIVE METABOLIC PANEL - Abnormal    Glucose 155 (*)     Sodium 137      Potassium 4.4      Chloride 106      Bicarbonate 16 (*)     Anion Gap 19      Urea Nitrogen 13      Creatinine 0.88      eGFR >90      Calcium 7.8 (*)     Albumin 4.0      Alkaline Phosphatase 54      Total Protein 6.2 (*)     AST 26      Bilirubin, Total 0.6      ALT 16     URINALYSIS WITH REFLEX CULTURE AND MICROSCOPIC - Abnormal    Color, Urine Light-Yellow      Appearance, Urine Clear      Specific Gravity, Urine 1.019      pH, Urine 5.5      Protein, Urine 50 (1+) (*)     Glucose, Urine Normal      Blood, Urine 0.06 (1+) (*)     Ketones, Urine NEGATIVE       Bilirubin, Urine NEGATIVE      Urobilinogen, Urine Normal      Nitrite, Urine NEGATIVE      Leukocyte Esterase, Urine NEGATIVE     LACTATE - Abnormal    Lactate 3.8 (*)     Narrative:     Venipuncture immediately after or during the administration of Metamizole may lead to falsely low results. Testing should be performed immediately  prior to Metamizole dosing.   BLOOD GAS VENOUS FULL PANEL UNSOLICITED - Abnormal    POCT pH, Venous 7.28 (*)     POCT pCO2, Venous 42      POCT pO2, Venous 31 (*)     POCT SO2, Venous 44 (*)     POCT Oxy Hemoglobin, Venous 43.2 (*)     POCT Hematocrit Calculated, Venous 36.0 (*)     POCT Sodium, Venous 134 (*)     POCT Potassium, Venous 4.3      POCT Chloride, Venous 105      POCT Ionized Calicum, Venous 1.07 (*)     POCT Glucose, Venous 156 (*)     POCT Lactate, Venous 5.8 (*)     POCT Base Excess, Venous -6.7 (*)     POCT HCO3 Calculated, Venous 19.7 (*)     POCT Hemoglobin, Venous 11.9 (*)     POCT Anion Gap, Venous 14.0      Patient Temperature 37.0     MAGNESIUM - Normal    Magnesium 2.07     TSH WITH REFLEX TO FREE T4 IF ABNORMAL - Normal    Thyroid Stimulating Hormone 2.40      Narrative:     TSH testing is performed using different testing methodology at [facility] than at other [facility]. Direct result comparisons should only be made within the same method.     DRUG SCREEN,URINE - Normal    Amphetamine Screen, Urine Presumptive Negative      Barbiturate Screen, Urine Presumptive Negative      Benzodiazepines Screen, Urine Presumptive Negative      Cannabinoid Screen, Urine Presumptive Negative      Cocaine Metabolite Screen, Urine Presumptive Negative      Fentanyl Screen, Urine Presumptive Negative      Opiate Screen, Urine Presumptive Negative      Oxycodone Screen, Urine Presumptive Negative      PCP Screen, Urine Presumptive Negative      Methadone Screen, Urine Presumptive Negative      Narrative:     Drug screen results are presumptive and should not  be used to assess   compliance with prescribed medication. Contact the performing [facility] laboratory   to add-on definitive confirmatory testing if clinically indicated.    Toxicology screening results are reported qualitatively. The concentration must   be greater than or equal to the cutoff to be reported as positive. The concentration   at which the screening test can detect an individual drug or metabolite varies.   The absence of expected drug(s) and/or drug metabolite(s) may indicate non-compliance,   inappropriate timing of specimen collection relative to drug administration, poor drug   absorption, diluted/adulterated urine, or limitations of testing. For medical purposes   only; not valid for forensic use.    Interpretive questions should be directed to the laboratory medical directors.   ACUTE TOXICOLOGY PANEL, BLOOD - Normal    Acetaminophen <10.0      Salicylate  <3      Alcohol <10     SERIAL TROPONIN-INITIAL - Normal    Troponin I, High Sensitivity <3      Narrative:     Less than 99th percentile of normal range cutoff-  Female and children under 18 years old <35 ng/L; Male <54 ng/L: Negative  Repeat testing should be performed if clinically indicated.     Female and children under 18 years old  ng/L; Male  ng/L:  Consistent with possible cardiac damage and possible increased clinical   risk. Serial measurements may help to assess extent of myocardial damage.     >120 ng/L: Consistent with cardiac damage, increased clinical risk and  myocardial infarction. Serial measurements may help assess extent of   myocardial damage.      NOTE: Children less than 1 year old may have higher baseline troponin   levels and results should be interpreted in conjunction with the overall   clinical context.    NOTE: Troponin I testing is performed using a different   testing methodology at [facility] than at other   [facility]. Direct result comparisons should only   be made within the same  method.     SERIAL TROPONIN, 1 HOUR - Normal    Troponin I, High Sensitivity 3      Narrative:     Less than 99th percentile of normal range cutoff-  Female and children under 18 years old <35 ng/L; Male <54 ng/L: Negative  Repeat testing should be performed if clinically indicated.     Female and children under 18 years old  ng/L; Male  ng/L:  Consistent with possible cardiac damage and possible increased clinical   risk. Serial measurements may help to assess extent of myocardial damage.     >120 ng/L: Consistent with cardiac damage, increased clinical risk and  myocardial infarction. Serial measurements may help assess extent of   myocardial damage.      NOTE: Children less than 1 year old may have higher baseline troponin   levels and results should be interpreted in conjunction with the overall   clinical context.    NOTE: Troponin I testing is performed using a different   testing methodology at [facility] than at other   [facility]. Direct result comparisons should only   be made within the same method.     LACTATE - Normal    Lactate 1.1      Narrative:     Venipuncture immediately after or during the administration of Metamizole may lead to falsely low results. Testing should be performed immediately  prior to Metamizole dosing.   URINALYSIS MICROSCOPIC WITH REFLEX CULTURE - Normal    WBC, Urine 1-5      RBC, Urine 1-2     TROPONIN SERIES- (INITIAL, 1 HR)    Narrative:     The following orders were created for panel order Troponin Series, (0, 1 HR).  Procedure                               Abnormality         Status                     ---------                               -----------         ------                     Troponin I, High Sensiti...[[id number]]  Normal              Final result               Troponin, High Sensitivi...[[id number]]  Normal              Final result                 Please view results for these tests on the individual orders.   URINALYSIS WITH  REFLEX CULTURE AND MICROSCOPIC    Narrative:     The following orders were created for panel order Urinalysis with Reflex Culture and Microscopic.  Procedure                               Abnormality         Status                     ---------                               -----------         ------                     Urinalysis with Reflex C...[[id number]]  Abnormal            Final result               Extra Urine Gray Tube[[id number]]                            Final result                 Please view results for these tests on the individual orders.   EXTRA URINE GRAY TUBE    Extra Tube Hold for add-ons.     BLOOD GAS LACTIC ACID, VENOUS         ED Course & MDM   ED Course as of 05/30/24 1052   Wed May 29, 2024   7124   I spoke with patient and his wife at this time, with his elevated lactate, I do have concerns that patient may be having undiagnosed seizures, I recommended neurology consultation in the hospital however patient declined this, states he would much rather follow-up as an outpatient.  Patient does not want to start any medications without a definitive diagnosis and he is unwilling to stay in the hospital for an EEG.  We engaged in shared decision making, I feel that it is reasonable for patient to follow-up outpatient however he is aware not to drive or operate heavy machinery, not to stand on elevated surfaces or swim in any pools, he is aware essentially that if anything were to acutely incapacitate him that he should not be doing something that can cause danger to himself or others.  His wife is at bedside, they are both comfortable with this plan, they would prefer to follow-up with [facility] given that that is where they have insurance through however I will still give them  neurology to follow-up as needed. [MK]   1950 Patient evaluated by neurology, they recommended him staying for spot EEG, patient notes understanding has capacity but declines.  He also does not want follow-up with  , but rather with [facility] given his insurance, will provide the follow-up information with patient, as well as start him on Keppra with strict return precautions.  He is neurologically intact and walked out of the emergency department. 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  Per review of patient's records, it appears he has a history of seizures, had a seizure and was brought to [facility] emergency room in December, patient was started on Keppra at that time however per documentation in the note, I do not believe that he has been taking it, patient's lactate is found to be elevated at 5.8 and I do have concerns that he may have had a seizure.  I did order EKG, troponin, labs.  Patient's EKG showed T wave inversion in III, however that is chronic and was present on previous EKG.   Patient CT head neck and thoracic spine were all negative, chest x-ray unremarkable, EKG nonischemic, troponin under 3 both times, tox panel and urine drug screen were negative, urinalysis was negative, patient's ABG unfortunately did show an elevated lactate of 5.8, per review of his records it appears that they were concerned that he had a seizure at [facility] back in December.  His lactate had down trended to 3.8.  I updated patient on his results, discussed with him that I really strongly suspect he may be having seizures, patient states they told him that 25 years ago, tried putting him on Dilantin and he had a lot of side effects related to it.  He states that he never took the Keppra that he was discharged with from [facility] because he does not want to  take a medication for diagnosis that is not clear.  I discussed with patient my recommendation for neurology to see him, initially patient had declined, however I was called back to the room after patient and wife had discussed and they are agreeable to a neurology consult at this time.  I did page neurology to see the patient while in the ED.  Final disposition pending their recommendations, patient signed out to Christy Montesinos.        Procedure  Procedures    Attending Note:  The patient was seen by the resident/fellow/FERNANDA.  I have personally performed a substantive portion of the encounter.  I have seen and examined the patient; agree with the workup, evaluation, MDM, management and diagnosis with the exception/addition of the following.  The care plan has been discussed with the resident/fellow; I have reviewed the resident/fellow’s note and agree with the documented findings with the exception/addition of the following:       HPI:   Lucio Sigala is a 54 year old with history of possible seizures presenting to the ED for evaluation. Initial history is provided predominately via report.  The patient reports he is a .  He reportedly was found lying on the floor at work with “snoring” respirations and “pinpoint” pupils and was given 4 mg IN Narcan and reportedly woke. In discussion with patient, he reports that he was fatigued today and had not consumed any caffeine today. He reports that he was working on the computer and apparently lost consciousness, waking in the ambulance. He denies any chest pain, SOB, palpitations, leg swelling, headache or focal weakness, numbness, or paresthesia.  He denies any recent travel, immobilization, prior malignancy, or prior DVT/PE.  He denies any significant family history including but not limited to arhythmia, CAD, or sudden cardiac death.     Per chart review, seen 12/29/2023 at [facility] for concern for seizures. CTH without acute intracranial process.  Case  discussed with neurology and patient started on 750 mg BID of Keppra. He was instructed to follow-up with epilepsy clinic. Per notes “This was discussed with the patient who adamantly stated that he did not have epilepsy, that when he had been on Dilantin previously had done nothing, that all of his problems had been fixed after having a chiropractor adjusted his neck, and that that is all he would be doing now.  The risks of having another seizure, especially while driving, were discussed at length with the patient.  It was highly recommended that he not drive until he was evaluated by neurology, which he refused to do.  Regardless, patient was provided with the contact information for the neurologist office as well as a paper prescription for Keppra which his wife agreed to keep on hand should he change his mind.”    Additional History Obtained from: See HPI       Physical Exam:  General: Grossly well-developed, awake, alert, NAD  Head: Normocephalic, no overt external signs of trauma. Well-healed scar over occipital scalp without signs of acute laceration or wounds  ENT: Mucus membranes moist, nares patent  Neck: Supple, trachea midline. Lower cervical paraspinal TTP, no gross stepoff/deformities  Neurologic: A&Ox4, FS, TM, EOMI, PERRL, CASIANO x 4 with grossly symmetric strength, 5/5, SILT grossly intact BUE and BLE. Babinski's downgoing, no clonus.   Cardiovascular: RRR, no MRG, pulses grossly symmetric. Chest wall non-tender, no crepitus  Respiratory: CTA B/L, no wheeze, rales or rhonchi  Abdomen: Soft, not appreciably tender, no evident rebound/guarding, no pulsatile masses palpable.   Back: No apparent CVA TTP, no midline TLS spine stepoffs, or acute deformities. Upper thoracic paraspinal TTP. No midline T/L spine TTP.   MSK: No gross bony deformities in BUE and BLE or TTP  Skin/Integumentary: Warm and dry  Psychiatric: Calm and cooperative. Normal mood and affect.     EKG Interpretation:  EKG 5/29/24 - 9277 -  sinus rhythm, right axis, rate 97, intervals , QRS 82, QTc 431. No obvious significant acute ST elevation or depression. TWI lead III seen 12/29/23.  Rate increased from 87.   Interpreted by provider as above    Differential Diagnoses Considered:  See MDM below    Chronic Medical Conditions Significantly Affecting Care:  See MDM below    External Records Reviewed:  I reviewed recent and relevant outside records as noted in HPI above and MDM below.     Independent Interpretation of Studies:    Laboratory/Imaging Studies:  Laboratory results personally reviewed and independently interpreted:  CBC without significant thrombocytopenia or leukocytosis. Hb 11.7, prior 12.8 approximately 5 months ago.   Metabolic panel without JOSE, HAGMA or significant electrolyte anomalies. HCO3 16, AG approximately 15. No transaminitis or hyperbilirubinemia  No marked hypomagnesemia  TSH WNL  HS troponin not elevated, repeat stable  VBG 7.28/42/31, lactate 5.8. Repeat lactate 3.8  UA negative LE and nitrites  Urine tox negative    Radiology imaging and preliminary and/or final reports personally reviewed/independently interpreted:  CXR   IMPRESSION:  No regions of airspace consolidation.    CT head  IMPRESSION:  No evidence of intracranial mass, extra-axial collection, hemorrhage  or infarction    CT thoracic spine  IMPRESSION:  No acute fracture or malalignment.  Minimal degenerative changes as  described.    CT cervical spine  IMPRESSION:  Normal examination of the cervical spine. No evidence of fracture,  subluxation, bony canal stenosis or bony foraminal narrowing    Social Determinants of Health Significantly Affecting Care:  See HPI/MDM    Prescription Drug Consideration:  See MDM    Diagnostic testing considered:  See MDM and relevant sections      Medical Decision Making/Course:  54 year old with history of possible seizures presenting to the ED for evaluation. The patient was found on the ground by coworkers. He was given  Narcan due to concern for possible pinpoint pupils.  On arrival, he is HDS, mentation appropriate, GCS 15, and without any apparent gross neurological deficits.  He reports pain over the upper thoracic, lower cervical spine, for which CT C/T spine obtained and was negative for acute traumatic injuries. CTH without acute intracranial process. CXR without acute process. He had no other areas of TTP on exam, nor clear signs of acute traumatic injuries. VBG demonstrated lactic acidosis concerning for possible seizure given history. Repeat lactate was downtrending. This ultimately normalized. No signs of sepsis, hypotension or hypoperfusion at present. His urine tox was negative and given history, suspiscion is lower for overdose.  Discussed at length with patient (and spouse with permission of patient) regarding possible seizures, as well as need for neurological evaluation and restrictions on driving, operating machinery or engaging in potentially dangerous activities.  We consulted neurology for further recommendations. He was signed out at approximately 1500 at end of shift to oncoming ED provider team, pending outstanding labs, re-evaluation, neurology consult, and final disposition. An opportunity to ask questions was provided and all were addressed at that time.  The patient verbalized understanding and was in agreement with plan.          Ayesha Allred PA-C  05/29/24 6050       Kennedy Vega MD  05/30/24 5881

## 2024-05-29 NOTE — PROGRESS NOTES
I received Lucio Sigala in signout from Ayesha Allred PA-C.  Please see the previous note for all HPI, PE and MDM up to the time of signout at 1500.  This is in addition to the primary documentation.    In brief Lucio Sigala is an 54 y.o. male presenting for unresponsive episode thought to be seizure. Patient previously seen for seizure in December and recommended starting Keppra and EEG, however pt declined neurology consult, EEG, and Keppra. On arrival, pt had lactate 5.8. Drug screen was negative.    Chief Complaint   Patient presents with    Unresponsive      At the time of signout we were awaiting: Disposition    MDM:  ED Course as of 05/29/24 2017   Wed May 29, 2024   [id number]   I spoke with patient and his wife at this time, with his elevated lactate, I do have concerns that patient may be having undiagnosed seizures, I recommended neurology consultation in the hospital however patient declined this, states he would much rather follow-up as an outpatient.  Patient does not want to start any medications without a definitive diagnosis and he is unwilling to stay in the hospital for an EEG.  We engaged in shared decision making, I feel that it is reasonable for patient to follow-up outpatient however he is aware not to drive or operate heavy machinery, not to stand on elevated surfaces or swim in any pools, he is aware essentially that if anything were to acutely incapacitate him that he should not be doing something that can cause danger to himself or others.  His wife is at bedside, they are both comfortable with this plan, they would prefer to follow-up with [facility] given that that is where they have insurance through however I will still give them  neurology to follow-up as needed. [MK]   1950 Patient evaluated by neurology, they recommended him staying for spot EEG, patient notes understanding has capacity but declines.  He also does not want follow-up with , but rather with [facility] given his  insurance, will provide the follow-up information with patient, as well as start him on Keppra with strict return precautions.  He is neurologically intact and walked out of the emergency department. [ND]      ED Course User Index  [MK] Ayesha Allred PA-C  [ND] Azael Bolton MD         Diagnoses as of 05/29/24 2017   Syncope and collapse   Elevated lactic acid level   Neck pain       At time of signout patient had initially declined talking being seen by neurology or staying for EEG and seizure workup, however on reevaluation by previous provider he had decided to stay for neurology consult. Repeat lactate downtrending to 3.8. With negative UDS and tox panel, it is very likely that the patient's episode of unresponsiveness was due to seizure rather than intoxication.    Neurology consultant evaluated the patient.  They recommended he stay for spot EEG and start 750 mg Keppra BID.  Patient declined to stay for spot EEG, says he will schedule as outpatient. He prefers to follow up with [facility] as his insurance is through [facility]. Neurology also recommended brain MRI as outpatient. I communicated these recommendations to the patient, and they were included in writing in his AVS.  Patient was provided with a printed prescription for Keppra 750 mg BID as well as lidocaine patches and naproxen.    Discussed seizure precautions with the patient, and advised him he should not participate in any activities that would cause himself or anyone else harm if he were to suddenly seize, including driving or swimming alone.  Advised him he should not drive for at least 6 months or until cleared by a neurologist.  Reviewed return precautions, including changes in mental status, confusion, new seizures, or any new concerns.  Patient was neurologically intact, and ambulated out of the department independently.      Assessment and plan discussed with Dr. Reed    Disposition: Discharge    Christy Escobar MD   Emergency  Medicine, PGY-1

## 2024-05-30 LAB
ATRIAL RATE: 97 BPM
HOLD SPECIMEN: NORMAL
P AXIS: 44 DEGREES
P OFFSET: 188 MS
P ONSET: 136 MS
PR INTERVAL: 162 MS
Q ONSET: 217 MS
QRS COUNT: 16 BEATS
QRS DURATION: 82 MS
QT INTERVAL: 340 MS
QTC CALCULATION(BAZETT): 431 MS
QTC FREDERICIA: 398 MS
R AXIS: 106 DEGREES
T AXIS: 23 DEGREES
T OFFSET: 387 MS
VENTRICULAR RATE: 97 BPM

## 2025-05-19 NOTE — CONSULTS
"Reason For Consult  Seizures    History Of Present Illness  Lucio Sigala is a 54 y.o. male presenting with history of seizures (not on any medications), who presents to the ED due to concern for a seizure that occurred during work. The patient is a  and was at work this morning. States that he was nodding off and that recently, he has been having difficulty sleeping (~5hrs a night), but this is not unusual for him. He recalls walking to the bathroom with no problem, no lightheadness, dizziness, palpitations, vision changes, olfactory changes. Doesn't remember making it back to his chair, or whether he was sitting or standing, but states that he was told that he was found on the floor. Additionally denies any vision changes, SOB, dizziness prior to the episode. Does not recall any incontinence, tongue biting, or muscle soreness after the event. Denies any recent illnesses, alcohol use, drug use, or changes in medications. Does not take any medications at baseline.     Spoke to the  who found patient on the floor. She states that he did not appear confused earlier in the morning. She had left the room, and messaged him that he was running late coming back. Around 30 minutes later, she found him on the floor. States that he was laying on the ground with eyes shut, breathing heavily, snoring, and diaphoretic. Did not notice any shaking and he was not responsive to name or touch. She called EMS and he was given Narcan. Woke up afterwards, but appeared lethargic and confused.     Of note, pt presented to the ED in 12/2023 due to c/f seizure. During that time, the patient reportedly was sleeping next to his wife when he suddenly \"threw his hands up\", turned his head to the left, and then had generalized convulsions for 5 minutes. Afterwards was post-ictal for an unclear period of time. No tongue biting or incontinence. He has a remote history of seizures ~20 years ago after a traumatic event when " Creatine stable for now. BMP reviewed- noted Estimated Creatinine Clearance: 42.5 mL/min (A) (based on SCr of 2.4 mg/dL (H)). according to latest data. Based on current GFR, CKD stage is stage 3 - GFR 30-59.  Monitor UOP and serial BMP and adjust therapy as needed. Renally dose meds. Avoid nephrotoxic medications and procedures.     he hit the back of his head. States that he has not had any seizures or episodes since then until 12/2023. Was previously on Dilantin, but had elected to stop the medication. Stated that at the time, he had head imaging and EEG that was all negative. He attributes to these episodes to his chronic cervical neck pain that resolved with adjustment by his chiropractor. During his admission in 12/2023, he was recommended to start Keppra 750mg BID and follow up with neurology. However, he had to cancel his appointment due scheduling conflicts. He has not started taking the Keppra.     Past Medical History  He has a past medical history of Seizures (Multi).   Denies any history of palpitations, arrhythmias, heart failure.    Medications  Not currently on any medications.    Surgical History  He has no past surgical history on file.     Social History  Tobacco: denies  Alcohol: drinks socially   Denies any recreational drug use.   Independent at home, works as a .    Family History  No family history of seizures.     Allergies  Patient has no known allergies.    Review of Systems  Negative except as per HPI     Physical Exam  GENERAL APPEARANCE:  No distress, alert, interactive and cooperative.     CARDIOVASCULAR: Regular, rate and rhythm, no murmurs, normal S1 and S2.     MENTAL STATE:   Orientation was normal to time, place and person. Recent and remote memory was intact. General fund of knowledge was intact.    CRANIAL NERVES:   CN 2   Visual fields full to confrontation.   CN 3, 4, 6   Pupils round, 3 mm in diameter, equally reactive to light. Lids symmetric; no ptosis. EOMs normal alignment. No nystagmus.   CN 5   Facial sensation intact bilaterally.   CN 7   Normal and symmetric facial strength. Nasolabial folds symmetric.   CN 8   Hearing intact to voice  CN 9   Palate elevates symmetrically.   CN 11   Normal strength of shoulder shrug and neck turning.   CN 12   Tongue midline, with normal bulk and  strength; no fasciculations.     MOTOR:   Muscle bulk was normal and tone was normal in both upper and lower extremities. No fasciculations, tremor or other abnormal movements were present.                     R       L  Delt          5       5  Bicep        5       5  Tricep       5       5   Wrist Flex  5       5   Wrist Ext   5       5            5       5    Hip Flex    4+       5  Hip Ext      5       5  Leg Ext      5       5  Leg Flex    5       5  DF             5       5  PF             5       5     REFLEXES:     RIGHT UE   LEFT UE   BR:2            BR:2     Biceps:2      Biceps:2     Triceps:2     Triceps:2       RIGHT LLE   LEFT LLE     Knee:2         Knee:2     Ankle:2        Ankle:2       SENSORY:   Sensory exam was normal. In both upper and lower extremities, sensation was intact to light touch    COORDINATION:    Coordination exam was normal. In both upper extremities, finger-nose-finger was intact without dysmetria or overshoot. In both lower extremities, heel-to-shin was intact.     GAIT:   Gait was normal. Station was stable with a normal base. Gait was stable with a normal arm swing and speed. No ataxia, shuffling, steppage or waddling was present. No Romberg sign was present.      Last Recorded Vitals  Blood pressure 142/62, pulse (!) 103, temperature 36.7 °C (98 °F), resp. rate 16, weight 93 kg (205 lb), SpO2 96%.    Relevant Results  Results for orders placed or performed during the hospital encounter of 05/29/24 (from the past 24 hour(s))   Blood Gas Venous Full Panel Unsolicited   Result Value Ref Range    POCT pH, Venous 7.28 (L) 7.33 - 7.43 pH    POCT pCO2, Venous 42 41 - 51 mm Hg    POCT pO2, Venous 31 (L) 35 - 45 mm Hg    POCT SO2, Venous 44 (L) 45 - 75 %    POCT Oxy Hemoglobin, Venous 43.2 (L) 45.0 - 75.0 %    POCT Hematocrit Calculated, Venous 36.0 (L) 41.0 - 52.0 %    POCT Sodium, Venous 134 (L) 136 - 145 mmol/L    POCT Potassium, Venous 4.3 3.5 - 5.3 mmol/L    POCT Chloride,  Venous 105 98 - 107 mmol/L    POCT Ionized Calicum, Venous 1.07 (L) 1.10 - 1.33 mmol/L    POCT Glucose, Venous 156 (H) 74 - 99 mg/dL    POCT Lactate, Venous 5.8 (HH) 0.4 - 2.0 mmol/L    POCT Base Excess, Venous -6.7 (L) -2.0 - 3.0 mmol/L    POCT HCO3 Calculated, Venous 19.7 (L) 22.0 - 26.0 mmol/L    POCT Hemoglobin, Venous 11.9 (L) 13.5 - 17.5 g/dL    POCT Anion Gap, Venous 14.0 10.0 - 25.0 mmol/L    Patient Temperature 37.0 degrees Celsius   CBC and Auto Differential   Result Value Ref Range    WBC 9.7 4.4 - 11.3 x10*3/uL    nRBC 0.0 0.0 - 0.0 /100 WBCs    RBC 6.06 (H) 4.50 - 5.90 x10*6/uL    Hemoglobin 11.7 (L) 13.5 - 17.5 g/dL    Hematocrit 37.6 (L) 41.0 - 52.0 %    MCV 62 (L) 80 - 100 fL    MCH 19.3 (L) 26.0 - 34.0 pg    MCHC 31.1 (L) 32.0 - 36.0 g/dL    RDW 17.2 (H) 11.5 - 14.5 %    Platelets 215 150 - 450 x10*3/uL    Neutrophils % 67.4 40.0 - 80.0 %    Immature Granulocytes %, Automated 1.0 (H) 0.0 - 0.9 %    Lymphocytes % 23.5 13.0 - 44.0 %    Monocytes % 6.7 2.0 - 10.0 %    Eosinophils % 0.8 0.0 - 6.0 %    Basophils % 0.6 0.0 - 2.0 %    Neutrophils Absolute 6.56 1.20 - 7.70 x10*3/uL    Immature Granulocytes Absolute, Automated 0.10 0.00 - 0.70 x10*3/uL    Lymphocytes Absolute 2.29 1.20 - 4.80 x10*3/uL    Monocytes Absolute 0.65 0.10 - 1.00 x10*3/uL    Eosinophils Absolute 0.08 0.00 - 0.70 x10*3/uL    Basophils Absolute 0.06 0.00 - 0.10 x10*3/uL   Comprehensive metabolic panel   Result Value Ref Range    Glucose 155 (H) 74 - 99 mg/dL    Sodium 137 136 - 145 mmol/L    Potassium 4.4 3.5 - 5.3 mmol/L    Chloride 106 98 - 107 mmol/L    Bicarbonate 16 (L) 21 - 32 mmol/L    Anion Gap 19 10 - 20 mmol/L    Urea Nitrogen 13 6 - 23 mg/dL    Creatinine 0.88 0.50 - 1.30 mg/dL    eGFR >90 >60 mL/min/1.73m*2    Calcium 7.8 (L) 8.6 - 10.6 mg/dL    Albumin 4.0 3.4 - 5.0 g/dL    Alkaline Phosphatase 54 33 - 120 U/L    Total Protein 6.2 (L) 6.4 - 8.2 g/dL    AST 26 9 - 39 U/L    Bilirubin, Total 0.6 0.0 - 1.2 mg/dL    ALT 16  10 - 52 U/L   Magnesium   Result Value Ref Range    Magnesium 2.07 1.60 - 2.40 mg/dL   TSH with reflex to Free T4 if abnormal   Result Value Ref Range    Thyroid Stimulating Hormone 2.40 0.44 - 3.98 mIU/L   Acute Toxicology Panel, Blood   Result Value Ref Range    Acetaminophen <10.0 10.0 - 30.0 ug/mL    Salicylate  <3 4 - 20 mg/dL    Alcohol <10 <=10 mg/dL   Troponin I, High Sensitivity, Initial   Result Value Ref Range    Troponin I, High Sensitivity <3 0 - 53 ng/L   Lactate   Result Value Ref Range    Lactate 3.8 (H) 0.4 - 2.0 mmol/L   Troponin, High Sensitivity, 1 Hour   Result Value Ref Range    Troponin I, High Sensitivity 3 0 - 53 ng/L   Urinalysis with Reflex Culture and Microscopic   Result Value Ref Range    Color, Urine Light-Yellow Light-Yellow, Yellow, Dark-Yellow    Appearance, Urine Clear Clear    Specific Gravity, Urine 1.019 1.005 - 1.035    pH, Urine 5.5 5.0, 5.5, 6.0, 6.5, 7.0, 7.5, 8.0    Protein, Urine 50 (1+) (A) NEGATIVE, 10 (TRACE), 20 (TRACE) mg/dL    Glucose, Urine Normal Normal mg/dL    Blood, Urine 0.06 (1+) (A) NEGATIVE    Ketones, Urine NEGATIVE NEGATIVE mg/dL    Bilirubin, Urine NEGATIVE NEGATIVE    Urobilinogen, Urine Normal Normal mg/dL    Nitrite, Urine NEGATIVE NEGATIVE    Leukocyte Esterase, Urine NEGATIVE NEGATIVE   Urinalysis Microscopic   Result Value Ref Range    WBC, Urine 1-5 1-5, NONE /HPF    RBC, Urine 1-2 NONE, 1-2, 3-5 /HPF   Drug Screen, Urine   Result Value Ref Range    Amphetamine Screen, Urine Presumptive Negative Presumptive Negative    Barbiturate Screen, Urine Presumptive Negative Presumptive Negative    Benzodiazepines Screen, Urine Presumptive Negative Presumptive Negative    Cannabinoid Screen, Urine Presumptive Negative Presumptive Negative    Cocaine Metabolite Screen, Urine Presumptive Negative Presumptive Negative    Fentanyl Screen, Urine Presumptive Negative Presumptive Negative    Opiate Screen, Urine Presumptive Negative Presumptive Negative     Oxycodone Screen, Urine Presumptive Negative Presumptive Negative    PCP Screen, Urine Presumptive Negative Presumptive Negative    Methadone Screen, Urine Presumptive Negative Presumptive Negative     CT Head 5/29:   No evidence of intracranial mass, extra-axial collection, hemorrhage  or infarction    CT Cervical and thoracic spine 5/29:  FINDINGS:   The alignment is anatomic.  There is no fracture or traumatic  subluxation.   The vertebral body heights are well maintained.  There is mild disc  space narrowing.  There is small Schmorl's node deformities.  Superior  endplate at T11-T12.  There is minimal facet arthrosis.  There is no  significant central canal stenosis demonstrated.  There is minimal  narrowing in the neuroforamina at T9-10 and T10-11.  The paravertebral soft tissues are within normal limits.  The  visualized chest and abdomen are unremarkable.  IMPRESSION:  No acute fracture or malalignment.  Minimal degenerative changes as  described.    Assessment/Plan   Lucio Sigala is a 54 y.o. male presenting with history of seizures (not on any medications), who presents to the ED due to concern for a seizure that occurred during work today. Was found down, unresponsive, diaphoretic, breathing heavily. In the ED, UDS was negative, labs was significant for lactate of 5.8 -> 3.8 s/p 1L NS. EKG showed NSR. Given history of seizures, presentation is concerning for new seizure event. Although the event was not observed, he was found down, unresponsive, and confused/lethargic afterwards. Additionally, he recently had an event in 12/2023 during which he woke up with generalized convulsions and L head deviation. No known precipitating cause of this seizure, his UDS negative, electrolytes WNL, no recent illnessness, or trauma. Patient prefers to not be admitted. However, he is agreeable to starting Kera, obtaining routine EEG/MRI, and close follow up with Neurology outpatient.     4D Classification of the Paroxysmal  Episodes:  Epileptic  Semiology: L versive, bilateral upper tonic -> GTC  Epileptic Zone: R hemisphere   Frequency: 3 episodes: 25 years ago, 12/2023, 5/29/24  Localizing Signs: L versive   Etiology: unknown   History of Status Epilepticus: None  Co-morbidities: TBI  Previous AEDs: Dilantin    Recommendations:   - Would recommend routine EEG   - Can obtain MRI Brain w/o epilepsy protocol as an outpatient   - Start on Keppra 750mg BID  - Close follow up with outpatient Neurology   - Seizure precautions: Do not to drive, use power tools or operate heavy machinery, and should not be on ladders. Use the shower and not the bath. Likewise, refrain from any activity which could result in injury to themselves or others if they had a seizure or lost consciousness. These restrictions should continue until instructed by a doctor to do otherwise.      Zamzam Acosta MD  Neurology, PGY-1      ====================  Senior Addendum Note:     Briefly  is a 54 year-old male  with who brought to the ED after he was found unresponsive and drooling from his mouth. His last memory was this AM feeling sleepy and was dozing off. Lactate level 3.8. CTH is normal.   The patient had a prior Hx  of TBI after a flight ~1999s and was put on Dialintin for few month but he stopped it. On 12/2023, his wife reported that he was asleep then started to have left head version and bilateral arm tonic followed by GTC lasting for 5 min with no sphincter incontinence and post ictal confusion. He was recommended to be on keppra but the patient declined to take the medication. Prior EEG reportedly per chart normal. Impression epilepsy disorder probably related TBI. Extensively discussed with the patient seizure restrictions including driving, carrying weapons, operating heavy machinery have to be started until clear by a neurologist. Risk of SUDEP was discussed if epilepsy not controlled. Patient and his wife appreciate and understand.      Recommended starting keppra 750mg BID, routine EEG. Close follow-up with neurology clinic for brain MRI w/wo epilepsy protocol.     Kenny Lundberg MD  PGY-3 Neurology        The above recommendation and assessment were discussed with epilepsy attending  who agree with the above